# Patient Record
Sex: MALE | HISPANIC OR LATINO | URBAN - METROPOLITAN AREA
[De-identification: names, ages, dates, MRNs, and addresses within clinical notes are randomized per-mention and may not be internally consistent; named-entity substitution may affect disease eponyms.]

---

## 2018-10-10 ENCOUNTER — OFFICE VISIT (OUTPATIENT)
Dept: URBAN - METROPOLITAN AREA CLINIC 33 | Facility: CLINIC | Age: 63
End: 2018-10-10
Payer: COMMERCIAL

## 2018-10-10 PROCEDURE — 99214 OFFICE O/P EST MOD 30 MIN: CPT | Performed by: OPTOMETRIST

## 2018-10-10 ASSESSMENT — INTRAOCULAR PRESSURE
OD: 11
OS: 12

## 2018-10-10 ASSESSMENT — VISUAL ACUITY
OD: 20/200
OS: 20/30

## 2018-10-10 NOTE — IMPRESSION/PLAN
Impression: Dry eye syndrome of bilateral lacrimal glands: H04.123. Plan: Dry eyes account for the patient's complaints. There is no evidence of permanent changes to the cornea. Explained condition does not have a cure and will need artificial tears for maintenance. Recommended using Systane Gel before bedtime and AT's several times during the day.

## 2018-12-12 ENCOUNTER — OFFICE VISIT (OUTPATIENT)
Dept: URBAN - METROPOLITAN AREA CLINIC 33 | Facility: CLINIC | Age: 63
End: 2018-12-12
Payer: COMMERCIAL

## 2018-12-12 DIAGNOSIS — Z96.1 PRESENCE OF INTRAOCULAR LENS: ICD-10-CM

## 2018-12-12 DIAGNOSIS — H35.351 CYSTOID MACULAR DEGENERATION, RIGHT EYE: Primary | ICD-10-CM

## 2018-12-12 PROCEDURE — 99213 OFFICE O/P EST LOW 20 MIN: CPT | Performed by: OPTOMETRIST

## 2018-12-12 ASSESSMENT — VISUAL ACUITY
OD: CF 5FT
OS: 20/50

## 2018-12-12 ASSESSMENT — INTRAOCULAR PRESSURE
OS: 18
OD: 18

## 2018-12-12 NOTE — IMPRESSION/PLAN
Impression: Presence of intraocular lens: Z96.1. Plan: Posterior vitreous detachment accounts for the patient's complaints. There is no evidence of retinal pathology. All signs and risks of retinal detachment and tears were discussed in detail. Patient instructed to call the office immediately if any symptoms noted. Recommend the patient return to office for follow up w/ dilated examination.

## 2018-12-12 NOTE — IMPRESSION/PLAN
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 Plan: h/o CME OD post PPVx (RD repair) in 2017. BCVA decreased OD and discussed with patient that vision unlikely to improve. Vision has been stable since 1 year. Monitor.

## 2019-03-05 ENCOUNTER — OFFICE VISIT (OUTPATIENT)
Dept: URBAN - METROPOLITAN AREA CLINIC 33 | Facility: CLINIC | Age: 64
End: 2019-03-05
Payer: COMMERCIAL

## 2019-03-05 PROCEDURE — 99212 OFFICE O/P EST SF 10 MIN: CPT | Performed by: OPHTHALMOLOGY

## 2019-03-05 PROCEDURE — 92134 CPTRZ OPH DX IMG PST SGM RTA: CPT | Performed by: OPHTHALMOLOGY

## 2019-03-05 ASSESSMENT — INTRAOCULAR PRESSURE
OD: 12
OS: 12

## 2019-03-05 NOTE — IMPRESSION/PLAN
Impression: Dry eye syndrome of bilateral lacrimal glands: H04.123. Plan: Dry eyes account for the patient's complaints. There is no evidence of permanent changes to the cornea. Explained condition does not have a cure and will need artificial tears for maintenance.  Recommend appt w/ Dr Jani Hitchcock for new Mrx when pt ready

## 2019-03-12 ENCOUNTER — OFFICE VISIT (OUTPATIENT)
Dept: URBAN - METROPOLITAN AREA CLINIC 33 | Facility: CLINIC | Age: 64
End: 2019-03-12
Payer: COMMERCIAL

## 2019-03-12 DIAGNOSIS — H52.4 PRESBYOPIA: Primary | ICD-10-CM

## 2019-03-12 PROCEDURE — 92012 INTRM OPH EXAM EST PATIENT: CPT | Performed by: OPTOMETRIST

## 2019-03-12 ASSESSMENT — VISUAL ACUITY
OD: CF 5
OS: 20/30

## 2019-03-12 NOTE — IMPRESSION/PLAN
Impression: Presbyopia: H52.4. Plan: Educated patient about today's findings. Order refraction to determine patient's best corrected visual acuity as it relates to presbyopia- dispensed Rx to patient. Patient was educated about 1-2 week adaptation period with new Rx. Decreased BCVA OD, due to RD.

## 2019-09-19 ENCOUNTER — OFFICE VISIT (OUTPATIENT)
Dept: URBAN - METROPOLITAN AREA CLINIC 33 | Facility: CLINIC | Age: 64
End: 2019-09-19
Payer: COMMERCIAL

## 2019-09-19 PROCEDURE — 99213 OFFICE O/P EST LOW 20 MIN: CPT | Performed by: OPTOMETRIST

## 2019-09-19 RX ORDER — LOTEPREDNOL ETABONATE 5 MG/G
0.5 % GEL OPHTHALMIC
Qty: 5 | Refills: 0 | Status: INACTIVE
Start: 2019-09-19 | End: 2019-10-01

## 2019-09-19 NOTE — IMPRESSION/PLAN
Impression: Dry eye syndrome of bilateral lacrimal glands: H04.123. Plan: Dry eyes account for the patient's complaints. There is no evidence of permanent changes to the cornea. Explained condition does not have a cure and will need artificial tears for maintenance. Continue AT gtts and gel QHS OU. Recommend patient start Lotemax gel BID x 2wks.  Consider punctal plugs OU

## 2019-10-21 ENCOUNTER — OFFICE VISIT (OUTPATIENT)
Dept: URBAN - METROPOLITAN AREA CLINIC 33 | Facility: CLINIC | Age: 64
End: 2019-10-21
Payer: COMMERCIAL

## 2019-10-21 PROCEDURE — 99213 OFFICE O/P EST LOW 20 MIN: CPT | Performed by: OPTOMETRIST

## 2019-10-21 ASSESSMENT — INTRAOCULAR PRESSURE
OS: 15
OD: 15

## 2019-10-21 NOTE — IMPRESSION/PLAN
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 Condition: established, stable. Plan: Continue AT's QID OU and Lotemax Gel BID OU.  Taper off Lotemax and RTC for punctal plugs in 1.5 weeks

## 2019-10-31 ENCOUNTER — OFFICE VISIT (OUTPATIENT)
Dept: URBAN - METROPOLITAN AREA CLINIC 33 | Facility: CLINIC | Age: 64
End: 2019-10-31
Payer: COMMERCIAL

## 2019-10-31 ASSESSMENT — INTRAOCULAR PRESSURE
OD: 14
OS: 16

## 2019-11-13 ENCOUNTER — OFFICE VISIT (OUTPATIENT)
Dept: URBAN - METROPOLITAN AREA CLINIC 33 | Facility: CLINIC | Age: 64
End: 2019-11-13
Payer: COMMERCIAL

## 2019-11-13 DIAGNOSIS — H04.123 DRY EYE SYNDROME OF BILATERAL LACRIMAL GLANDS: Primary | ICD-10-CM

## 2019-11-13 PROCEDURE — 99213 OFFICE O/P EST LOW 20 MIN: CPT | Performed by: OPTOMETRIST

## 2019-11-13 ASSESSMENT — INTRAOCULAR PRESSURE
OD: 14
OS: 14

## 2019-11-13 NOTE — IMPRESSION/PLAN
Impression: Dry eye syndrome of bilateral lacrimal glands: H04.123. Plan: Dry eyes account for the patient's complaints. There is no evidence of permanent changes to the cornea. Explained condition does not have a cure and will need artificial tears for maintenance. 

Continue artifical tears

## 2020-03-13 ENCOUNTER — OFFICE VISIT (OUTPATIENT)
Dept: URBAN - METROPOLITAN AREA CLINIC 33 | Facility: CLINIC | Age: 65
End: 2020-03-13
Payer: COMMERCIAL

## 2020-03-13 PROCEDURE — 99213 OFFICE O/P EST LOW 20 MIN: CPT | Performed by: OPTOMETRIST

## 2020-03-13 ASSESSMENT — INTRAOCULAR PRESSURE
OS: 14
OD: 13

## 2020-03-13 ASSESSMENT — VISUAL ACUITY: OS: 20/30

## 2020-10-02 ENCOUNTER — OFFICE VISIT (OUTPATIENT)
Dept: URBAN - METROPOLITAN AREA CLINIC 33 | Facility: CLINIC | Age: 65
End: 2020-10-02
Payer: MEDICARE

## 2020-10-02 PROCEDURE — 99213 OFFICE O/P EST LOW 20 MIN: CPT | Performed by: OPTOMETRIST

## 2020-10-02 ASSESSMENT — INTRAOCULAR PRESSURE
OS: 14
OD: 14

## 2020-10-02 ASSESSMENT — VISUAL ACUITY: OS: 20/25

## 2020-10-02 NOTE — IMPRESSION/PLAN
Impression: Dry eye syndrome of bilateral lacrimal glands: H04.123. Plan: Dry eyes account for the patient's complaints. There is no evidence of permanent changes to the cornea. Discussed fluctuation vision is due to dry eyes. Recommend patient starts artificial tears.

## 2021-03-04 ENCOUNTER — OFFICE VISIT (OUTPATIENT)
Dept: URBAN - METROPOLITAN AREA CLINIC 33 | Facility: CLINIC | Age: 66
End: 2021-03-04
Payer: MEDICARE

## 2021-03-04 DIAGNOSIS — H33.8 OTHER RETINAL DETACHMENTS: ICD-10-CM

## 2021-03-04 PROCEDURE — 99213 OFFICE O/P EST LOW 20 MIN: CPT | Performed by: OPTOMETRIST

## 2021-03-04 ASSESSMENT — INTRAOCULAR PRESSURE
OD: 13
OS: 14

## 2021-03-04 ASSESSMENT — VISUAL ACUITY
OD: 20/150
OS: 20/20

## 2021-03-04 NOTE — IMPRESSION/PLAN
Impression: Other retinal detachments: H33.8. Plan: S/P retinal detachment repair. Retina appears flat. Decreased BCVA OD due to RD. No treatment necessary.

## 2021-03-04 NOTE — IMPRESSION/PLAN
Impression: Dry eye syndrome of bilateral lacrimal glands: H04.123. Plan: Dry eyes account for the patient's complaints. There is no evidence of permanent changes to the cornea. Continue using artificial tears for comfort.

## 2022-04-04 ENCOUNTER — OFFICE VISIT (OUTPATIENT)
Dept: URBAN - METROPOLITAN AREA CLINIC 33 | Facility: CLINIC | Age: 67
End: 2022-04-04
Payer: MEDICARE

## 2022-04-04 DIAGNOSIS — E11.9 TYPE 2 DIABETES MELLITUS W/O COMPLICATION: Primary | ICD-10-CM

## 2022-04-04 PROCEDURE — 99214 OFFICE O/P EST MOD 30 MIN: CPT | Performed by: OPTOMETRIST

## 2022-04-04 ASSESSMENT — KERATOMETRY
OD: 41.25
OS: 41.63

## 2022-04-04 ASSESSMENT — INTRAOCULAR PRESSURE
OS: 13
OD: 13

## 2022-04-04 ASSESSMENT — VISUAL ACUITY
OD: 20/150
OS: 20/25

## 2022-04-04 NOTE — IMPRESSION/PLAN
Impression: Type 2 diabetes mellitus w/o complication: U69.5. Plan: Diabetes w/o Complications: No signs of diabetic retinopathy noted. No treatment necessary at this time. Continue management with PCP. Letter sent to PCP regarding status of ocular health. 

-Patient may use +3.25 readers

## 2023-03-24 ENCOUNTER — OFFICE VISIT (OUTPATIENT)
Dept: URBAN - METROPOLITAN AREA CLINIC 33 | Facility: CLINIC | Age: 68
End: 2023-03-24
Payer: MEDICARE

## 2023-03-24 DIAGNOSIS — E11.9 TYPE 2 DIABETES MELLITUS W/O COMPLICATION: Primary | ICD-10-CM

## 2023-03-24 PROCEDURE — 99203 OFFICE O/P NEW LOW 30 MIN: CPT | Performed by: OPHTHALMOLOGY

## 2023-03-24 ASSESSMENT — INTRAOCULAR PRESSURE
OD: 15
OS: 14

## 2023-03-24 NOTE — IMPRESSION/PLAN
Impression: Type 2 diabetes mellitus w/o complication: J32.6. Plan: Discussed diagnosis in detail with patient. No treatment is required at this time. Discussed risks of progression. Emphasized blood sugar control. Will continue to observe condition and or symptoms. Call if [address] worsens. OK to proceed with refraction and updated glasses prescription at patient convenience.

## 2023-06-22 ENCOUNTER — OFFICE VISIT (OUTPATIENT)
Dept: URBAN - METROPOLITAN AREA CLINIC 33 | Facility: CLINIC | Age: 68
End: 2023-06-22
Payer: COMMERCIAL

## 2023-06-22 DIAGNOSIS — H52.4 PRESBYOPIA: Primary | ICD-10-CM

## 2023-06-22 PROCEDURE — 92012 INTRM OPH EXAM EST PATIENT: CPT | Performed by: OPTOMETRIST

## 2023-06-22 ASSESSMENT — VISUAL ACUITY
OD: 20/200
OS: 20/25

## 2023-06-22 ASSESSMENT — INTRAOCULAR PRESSURE
OD: 16
OS: 15

## 2023-06-22 NOTE — IMPRESSION/PLAN
Impression: Presbyopia: H52.4. Plan: Educated patient about today's findings. Order refraction to determine patient's best corrected visual acuity. Discussed decreased vision OD due to history of retinal detachment.